# Patient Record
Sex: MALE | Race: WHITE | NOT HISPANIC OR LATINO
[De-identification: names, ages, dates, MRNs, and addresses within clinical notes are randomized per-mention and may not be internally consistent; named-entity substitution may affect disease eponyms.]

---

## 2017-03-30 ENCOUNTER — APPOINTMENT (OUTPATIENT)
Dept: SURGICAL ONCOLOGY | Facility: CLINIC | Age: 82
End: 2017-03-30

## 2017-03-30 VITALS
HEIGHT: 67 IN | WEIGHT: 174 LBS | HEART RATE: 75 BPM | BODY MASS INDEX: 27.31 KG/M2 | SYSTOLIC BLOOD PRESSURE: 148 MMHG | RESPIRATION RATE: 14 BRPM | DIASTOLIC BLOOD PRESSURE: 73 MMHG

## 2017-04-04 ENCOUNTER — RESULT REVIEW (OUTPATIENT)
Age: 82
End: 2017-04-04

## 2017-04-05 ENCOUNTER — OUTPATIENT (OUTPATIENT)
Dept: OUTPATIENT SERVICES | Facility: HOSPITAL | Age: 82
LOS: 1 days | End: 2017-04-05
Payer: COMMERCIAL

## 2017-04-05 DIAGNOSIS — C43.9 MALIGNANT MELANOMA OF SKIN, UNSPECIFIED: ICD-10-CM

## 2017-04-05 PROCEDURE — 88321 CONSLTJ&REPRT SLD PREP ELSWR: CPT

## 2017-04-07 ENCOUNTER — OUTPATIENT (OUTPATIENT)
Dept: OUTPATIENT SERVICES | Facility: HOSPITAL | Age: 82
LOS: 1 days | End: 2017-04-07
Payer: COMMERCIAL

## 2017-04-07 VITALS
SYSTOLIC BLOOD PRESSURE: 128 MMHG | WEIGHT: 179.02 LBS | DIASTOLIC BLOOD PRESSURE: 78 MMHG | OXYGEN SATURATION: 98 % | HEIGHT: 66 IN | HEART RATE: 74 BPM | TEMPERATURE: 99 F | RESPIRATION RATE: 18 BRPM

## 2017-04-07 DIAGNOSIS — Z98.890 OTHER SPECIFIED POSTPROCEDURAL STATES: Chronic | ICD-10-CM

## 2017-04-07 DIAGNOSIS — C43.9 MALIGNANT MELANOMA OF SKIN, UNSPECIFIED: ICD-10-CM

## 2017-04-07 DIAGNOSIS — Z85.820 PERSONAL HISTORY OF MALIGNANT MELANOMA OF SKIN: Chronic | ICD-10-CM

## 2017-04-07 DIAGNOSIS — Z85.46 PERSONAL HISTORY OF MALIGNANT NEOPLASM OF PROSTATE: Chronic | ICD-10-CM

## 2017-04-07 DIAGNOSIS — C43.61 MALIGNANT MELANOMA OF RIGHT UPPER LIMB, INCLUDING SHOULDER: ICD-10-CM

## 2017-04-07 DIAGNOSIS — C44.91 BASAL CELL CARCINOMA OF SKIN, UNSPECIFIED: Chronic | ICD-10-CM

## 2017-04-07 DIAGNOSIS — Z01.818 ENCOUNTER FOR OTHER PREPROCEDURAL EXAMINATION: ICD-10-CM

## 2017-04-07 DIAGNOSIS — I10 ESSENTIAL (PRIMARY) HYPERTENSION: ICD-10-CM

## 2017-04-07 LAB
ANION GAP SERPL CALC-SCNC: 11 MMOL/L — SIGNIFICANT CHANGE UP (ref 5–17)
BUN SERPL-MCNC: 27 MG/DL — HIGH (ref 7–23)
CALCIUM SERPL-MCNC: 9.6 MG/DL — SIGNIFICANT CHANGE UP (ref 8.4–10.5)
CHLORIDE SERPL-SCNC: 103 MMOL/L — SIGNIFICANT CHANGE UP (ref 96–108)
CO2 SERPL-SCNC: 26 MMOL/L — SIGNIFICANT CHANGE UP (ref 22–31)
CREAT SERPL-MCNC: 1.43 MG/DL — HIGH (ref 0.5–1.3)
GLUCOSE SERPL-MCNC: 86 MG/DL — SIGNIFICANT CHANGE UP (ref 70–99)
HCT VFR BLD CALC: 41.4 % — SIGNIFICANT CHANGE UP (ref 39–50)
HGB BLD-MCNC: 13.5 G/DL — SIGNIFICANT CHANGE UP (ref 13–17)
MCHC RBC-ENTMCNC: 31.4 PG — SIGNIFICANT CHANGE UP (ref 27–34)
MCHC RBC-ENTMCNC: 32.6 GM/DL — SIGNIFICANT CHANGE UP (ref 32–36)
MCV RBC AUTO: 96.3 FL — SIGNIFICANT CHANGE UP (ref 80–100)
PLATELET # BLD AUTO: 163 K/UL — SIGNIFICANT CHANGE UP (ref 150–400)
POTASSIUM SERPL-MCNC: 4.6 MMOL/L — SIGNIFICANT CHANGE UP (ref 3.5–5.3)
POTASSIUM SERPL-SCNC: 4.6 MMOL/L — SIGNIFICANT CHANGE UP (ref 3.5–5.3)
RBC # BLD: 4.3 M/UL — SIGNIFICANT CHANGE UP (ref 4.2–5.8)
RBC # FLD: 13.4 % — SIGNIFICANT CHANGE UP (ref 10.3–14.5)
SODIUM SERPL-SCNC: 140 MMOL/L — SIGNIFICANT CHANGE UP (ref 135–145)
WBC # BLD: 15.43 K/UL — HIGH (ref 3.8–10.5)
WBC # FLD AUTO: 15.43 K/UL — HIGH (ref 3.8–10.5)

## 2017-04-07 PROCEDURE — 93010 ELECTROCARDIOGRAM REPORT: CPT

## 2017-04-07 PROCEDURE — 93005 ELECTROCARDIOGRAM TRACING: CPT

## 2017-04-07 PROCEDURE — 80048 BASIC METABOLIC PNL TOTAL CA: CPT

## 2017-04-07 PROCEDURE — 85027 COMPLETE CBC AUTOMATED: CPT

## 2017-04-07 RX ORDER — LIDOCAINE HCL 20 MG/ML
0.2 VIAL (ML) INJECTION ONCE
Qty: 0 | Refills: 0 | Status: DISCONTINUED | OUTPATIENT
Start: 2017-04-18 | End: 2017-05-03

## 2017-04-07 RX ORDER — SODIUM CHLORIDE 9 MG/ML
3 INJECTION INTRAMUSCULAR; INTRAVENOUS; SUBCUTANEOUS EVERY 8 HOURS
Qty: 0 | Refills: 0 | Status: DISCONTINUED | OUTPATIENT
Start: 2017-04-18 | End: 2017-05-03

## 2017-04-07 NOTE — H&P PST ADULT - NSANTHOSAYNRD_GEN_A_CORE
No. MEAGAN screening performed.  STOP BANG Legend: 0-2 = LOW Risk; 3-4 = INTERMEDIATE Risk; 5-8 = HIGH Risk

## 2017-04-07 NOTE — H&P PST ADULT - PSH
Basal cell carcinoma  excision from back x2 and right cheek with MOHS 2011  H/O circumcision  early age  H/O Malignant melanoma  excision of melanoma on back x 2 2008/ 2016  H/O prostate cancer  s/p seed inplantation 2008

## 2017-04-07 NOTE — H&P PST ADULT - PMH
Basal cell carcinoma  excised from back x2 and Excision of basal cell right cheek with MOHS 2011  Hyperlipidemia    Hypertension    Macular degeneration  beginning stage  Melanoma  right shoulder  Prostate cancer  s/p  seed inplant 2008

## 2017-04-07 NOTE — H&P PST ADULT - HISTORY OF PRESENT ILLNESS
This is a 84 year old male with right shoulder.  Pt has had renita x 3 years and told dermatologist of right shoulder.  Biopsy done and pt went to Dr Browne.  Now scheduled for wide excision melanoma right shoulder with plastic closure on 4-18-17

## 2017-04-12 ENCOUNTER — APPOINTMENT (OUTPATIENT)
Dept: PLASTIC SURGERY | Facility: CLINIC | Age: 82
End: 2017-04-12

## 2017-04-12 VITALS
SYSTOLIC BLOOD PRESSURE: 127 MMHG | TEMPERATURE: 98.2 F | BODY MASS INDEX: 28.12 KG/M2 | HEART RATE: 99 BPM | HEIGHT: 66 IN | DIASTOLIC BLOOD PRESSURE: 76 MMHG | WEIGHT: 175 LBS

## 2017-04-12 DIAGNOSIS — E78.00 PURE HYPERCHOLESTEROLEMIA, UNSPECIFIED: ICD-10-CM

## 2017-04-12 DIAGNOSIS — I10 ESSENTIAL (PRIMARY) HYPERTENSION: ICD-10-CM

## 2017-04-13 PROBLEM — I10 HYPERTENSION: Status: ACTIVE | Noted: 2017-04-12

## 2017-04-13 PROBLEM — E78.00 HIGH CHOLESTEROL: Status: ACTIVE | Noted: 2017-04-12

## 2017-04-17 ENCOUNTER — RESULT REVIEW (OUTPATIENT)
Age: 82
End: 2017-04-17

## 2017-04-18 ENCOUNTER — APPOINTMENT (OUTPATIENT)
Dept: SURGICAL ONCOLOGY | Facility: HOSPITAL | Age: 82
End: 2017-04-18

## 2017-04-18 ENCOUNTER — OUTPATIENT (OUTPATIENT)
Dept: OUTPATIENT SERVICES | Facility: HOSPITAL | Age: 82
LOS: 1 days | End: 2017-04-18
Payer: MEDICARE

## 2017-04-18 VITALS
DIASTOLIC BLOOD PRESSURE: 78 MMHG | HEART RATE: 71 BPM | OXYGEN SATURATION: 100 % | SYSTOLIC BLOOD PRESSURE: 132 MMHG | RESPIRATION RATE: 16 BRPM

## 2017-04-18 VITALS
TEMPERATURE: 99 F | SYSTOLIC BLOOD PRESSURE: 147 MMHG | HEIGHT: 66 IN | HEART RATE: 74 BPM | WEIGHT: 179.02 LBS | DIASTOLIC BLOOD PRESSURE: 78 MMHG | OXYGEN SATURATION: 98 % | RESPIRATION RATE: 18 BRPM

## 2017-04-18 DIAGNOSIS — Z85.820 PERSONAL HISTORY OF MALIGNANT MELANOMA OF SKIN: Chronic | ICD-10-CM

## 2017-04-18 DIAGNOSIS — Z98.890 OTHER SPECIFIED POSTPROCEDURAL STATES: Chronic | ICD-10-CM

## 2017-04-18 DIAGNOSIS — C43.61 MALIGNANT MELANOMA OF RIGHT UPPER LIMB, INCLUDING SHOULDER: ICD-10-CM

## 2017-04-18 DIAGNOSIS — Z85.46 PERSONAL HISTORY OF MALIGNANT NEOPLASM OF PROSTATE: Chronic | ICD-10-CM

## 2017-04-18 DIAGNOSIS — C44.91 BASAL CELL CARCINOMA OF SKIN, UNSPECIFIED: Chronic | ICD-10-CM

## 2017-04-18 PROCEDURE — 88305 TISSUE EXAM BY PATHOLOGIST: CPT | Mod: 26

## 2017-04-18 PROCEDURE — 14021 TIS TRNFR S/A/L 10.1-30 SQCM: CPT

## 2017-04-18 PROCEDURE — 88305 TISSUE EXAM BY PATHOLOGIST: CPT

## 2017-04-18 PROCEDURE — 11606 EXC TR-EXT MAL+MARG >4 CM: CPT

## 2017-04-18 RX ORDER — FAMOTIDINE 10 MG/ML
2 INJECTION INTRAVENOUS
Qty: 0 | Refills: 0 | COMMUNITY

## 2017-04-18 RX ORDER — VALSARTAN 80 MG/1
1 TABLET ORAL
Qty: 0 | Refills: 0 | COMMUNITY

## 2017-04-18 RX ORDER — SODIUM CHLORIDE 9 MG/ML
1000 INJECTION, SOLUTION INTRAVENOUS
Qty: 0 | Refills: 0 | Status: DISCONTINUED | OUTPATIENT
Start: 2017-04-18 | End: 2017-05-03

## 2017-04-18 RX ORDER — SIMVASTATIN 20 MG/1
1 TABLET, FILM COATED ORAL
Qty: 0 | Refills: 0 | COMMUNITY

## 2017-04-18 RX ORDER — ASCORBIC ACID 60 MG
1 TABLET,CHEWABLE ORAL
Qty: 0 | Refills: 0 | COMMUNITY

## 2017-04-18 RX ORDER — CELECOXIB 200 MG/1
200 CAPSULE ORAL ONCE
Qty: 0 | Refills: 0 | Status: DISCONTINUED | OUTPATIENT
Start: 2017-04-18 | End: 2017-05-03

## 2017-04-18 RX ORDER — OXYCODONE HYDROCHLORIDE 5 MG/1
5 TABLET ORAL ONCE
Qty: 0 | Refills: 0 | Status: DISCONTINUED | OUTPATIENT
Start: 2017-04-18 | End: 2017-04-18

## 2017-04-18 RX ORDER — OXYCODONE HYDROCHLORIDE 5 MG/1
1 TABLET ORAL
Qty: 12 | Refills: 0 | OUTPATIENT
Start: 2017-04-18 | End: 2017-04-21

## 2017-04-18 RX ORDER — ONDANSETRON 8 MG/1
4 TABLET, FILM COATED ORAL ONCE
Qty: 0 | Refills: 0 | Status: DISCONTINUED | OUTPATIENT
Start: 2017-04-18 | End: 2017-05-03

## 2017-04-18 NOTE — BRIEF OPERATIVE NOTE - OPERATION/FINDINGS
local tissue reconstruction of 5.5  x4.4 cm defect after wide excision of melanoma.
minimum 1 cm margin

## 2017-04-18 NOTE — ASU DISCHARGE PLAN (ADULT/PEDIATRIC). - MEDICATION SUMMARY - MEDICATIONS TO TAKE
I will START or STAY ON the medications listed below when I get home from the hospital:    oxyCODONE 5 mg oral tablet  -- 1 tab(s) by mouth every 6 hours, As Needed MDD:6  -- Caution federal law prohibits the transfer of this drug to any person other  than the person for whom it was prescribed.  It is very important that you take or use this exactly as directed.  Do not skip doses or discontinue unless directed by your doctor.  May cause drowsiness.  Alcohol may intensify this effect.  Use care when operating dangerous machinery.  This prescription cannot be refilled.  Using more of this medication than prescribed may cause serious breathing problems.    -- Indication: For pain medication    valsartan 160 mg oral tablet  -- 1 tab(s) by mouth once a day (at bedtime)  -- Indication: For home medication    simvastatin 20 mg oral tablet  -- 1 tab(s) by mouth once a day (at bedtime)  -- Indication: For home medication    multivitamin  -- 1  by mouth once a day last dose 4-10-17  -- Indication: For home medication    Vitamin C 500 mg oral capsule  -- 1 cap(s) by mouth once a day  -- Indication: For home medication

## 2017-04-18 NOTE — ASU DISCHARGE PLAN (ADULT/PEDIATRIC). - NOTIFY
Persistent Nausea and Vomiting/Unable to Urinate/Fever greater than 101/Pain not relieved by Medications Bleeding that does not stop/Pain not relieved by Medications/Fever greater than 101/Persistent Nausea and Vomiting/Unable to Urinate/Swelling that continues

## 2017-04-18 NOTE — ASU PREOP CHECKLIST - TO WHOM
[facility] /report received from RUTHY Parks

## 2017-04-18 NOTE — ASU PATIENT PROFILE, ADULT - VISION (WITH CORRECTIVE LENSES IF THE PATIENT USUALLY WEARS THEM):
distance & reading/Normal vision: sees adequately in most situations; can see medication labels, newsprint

## 2017-04-18 NOTE — BRIEF OPERATIVE NOTE - PRE-OP DX
Melanoma of shoulder, right  04/18/2017    Active  Johnson Bronwe Melanoma of shoulder, right  04/18/2017    Active  Johnson Browne

## 2017-04-20 ENCOUNTER — TRANSCRIPTION ENCOUNTER (OUTPATIENT)
Age: 82
End: 2017-04-20

## 2017-04-21 LAB — SURGICAL PATHOLOGY STUDY: SIGNIFICANT CHANGE UP

## 2017-04-22 ENCOUNTER — TRANSCRIPTION ENCOUNTER (OUTPATIENT)
Age: 82
End: 2017-04-22

## 2017-04-26 ENCOUNTER — APPOINTMENT (OUTPATIENT)
Dept: PLASTIC SURGERY | Facility: CLINIC | Age: 82
End: 2017-04-26

## 2017-05-01 ENCOUNTER — APPOINTMENT (OUTPATIENT)
Dept: PLASTIC SURGERY | Facility: CLINIC | Age: 82
End: 2017-05-01

## 2017-05-10 ENCOUNTER — APPOINTMENT (OUTPATIENT)
Dept: PLASTIC SURGERY | Facility: CLINIC | Age: 82
End: 2017-05-10

## 2017-05-10 DIAGNOSIS — C43.59 MALIGNANT MELANOMA OF OTHER PART OF TRUNK: ICD-10-CM

## 2018-07-19 NOTE — ASU DISCHARGE PLAN (ADULT/PEDIATRIC). - MEDICATION SUMMARY - MEDICATIONS TO CHANGE
Disoriented I will SWITCH the dose or number of times a day I take the medications listed below when I get home from the hospital:  None

## 2019-07-30 NOTE — H&P PST ADULT - DOES THIS PATIENT HAVE A HISTORY OF OR HAS BEEN DX WITH HEART FAILURE?
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 Taking Ca/Vit D, discussed.  ROS reviewed/discussed. Last Bone Densitometry (7/19): +osteopenia\par Mammo/Sono (7/30/19): HD, NSF\par 
no

## 2020-10-21 NOTE — H&P PST ADULT - ADMIT DATE
07-Apr-2017 [Thin] : thin [Normal] : affect appropriate [de-identified] : no teeth  [de-identified] : no palpable adenopathy

## 2022-06-12 NOTE — H&P PST ADULT - ATTENDING COMMENTS
Jacky Colin(Resident) 84 year old man with a large diameter 0.45 mm melanoma of the right shoulder, scheduled for w.e. and reconstruction (Dr Johnson).    D/W Pt and wife in the office, and again today    All questions answered.  Consent on chart

## 2024-11-07 NOTE — ASU PREOP CHECKLIST - SPO2 (%)
"Orthopaedic Surgery Progress Note    Subjective:  No acute events overnight. Doing well. Pain well controlled. Denies chest pain, shortness of breath, or fevers.    Objective:  BP (!) 149/91   Pulse 82   Temp 36.6 °C (97.9 °F)   Resp 16   Ht 1.778 m (5' 10\")   Wt 97.5 kg (215 lb)   SpO2 99%   BMI 30.85 kg/m²     Gen: arousable, NAD, appropriately conversational  Cardiac: RRR to peripheral palpation  Resp: nonlabored on RA  GI: soft, nondistended    MSK:  RUE:   - STS intact  -Motor intact in axillary/AIN/PIN/ulnar distributions  -SILT axillary/radial/median/ulnar distributions  -Hand wwp, cap refill brisk  -Compartments soft and compressible, no pain with passive stretch of digits    No results found for this or any previous visit (from the past 24 hours).      FL fluoro images no charge   Final Result      XR forearm right 2 views   Final Result        Interval reduction right both-bone forearm fractures. The angulation   is improved but there is still displacement and overriding of both   the radius and ulna.        Nondisplaced distal ulnar diaphyseal fracture not as well seen as on   the earlier radiographs.        Signed by: Vinicius Farmer 11/5/2024 5:02 PM   Dictation workstation:   [id number]      XR elbow right 1-2 views   Final Result        Interval reduction right both-bone forearm fractures. The angulation   is improved but there is still displacement and overriding of both   the radius and ulna.        Nondisplaced distal ulnar diaphyseal fracture not as well seen as on   the earlier radiographs.        Signed by: Vinicius Farmer 11/5/2024 5:02 PM   Dictation workstation:   [id number]      CT cervical spine wo IV contrast   Final Result   CT HEAD:   1. No acute intracranial hemorrhage or depressed calvarial fracture.   2. Mild white matter hypodensity compatible with microangiopathy.             CT CERVICAL SPINE:   1. No acute fracture or traumatic malalignment of the cervical spine.   2. " Heterogenous enlargement of the thyroid gland. Further evaluation   with non-emergent thyroid ultrasound on an outpatient basis may be   obtained as per clinical need.   3. Fat density mass within the posterior neck on the right likely   represents a lipoma measuring 6.7 x 4.0 cm.             I personally reviewed the images/study and I agree with the findings   as stated by Dr. Rubens Quach. This study was interpreted at [place], [place].        MACRO:   None.        Signed by: Carine Noyola 11/5/2024 2:00 PM   Dictation workstation:   [id number]      CT head wo IV contrast   Final Result   CT HEAD:   1. No acute intracranial hemorrhage or depressed calvarial fracture.   2. Mild white matter hypodensity compatible with microangiopathy.             CT CERVICAL SPINE:   1. No acute fracture or traumatic malalignment of the cervical spine.   2. Heterogenous enlargement of the thyroid gland. Further evaluation   with non-emergent thyroid ultrasound on an outpatient basis may be   obtained as per clinical need.   3. Fat density mass within the posterior neck on the right likely   represents a lipoma measuring 6.7 x 4.0 cm.             I personally reviewed the images/study and I agree with the findings   as stated by Dr. Rubens Quach. This study was interpreted at [place], [place].        MACRO:   None.        Signed by: Carine Noyola 11/5/2024 2:00 PM   Dictation workstation:   [id number]      XR forearm right 2 views   Final Result   Midshaft both-bone forearm fracture with displacement and overriding.        Additional nondisplaced distal ulnar diaphyseal fracture.        Signed by: Vinicius Farmer 11/5/2024 1:39 PM   Dictation workstation:   [id number]      XR elbow right 1-2 views   Final Result   Midshaft both-bone forearm fracture with displacement and overriding.        Additional nondisplaced distal ulnar diaphyseal fracture.         Signed by: Vinicius Farmer 11/5/2024 1:39 PM   Dictation workstation:   [id number]      XR wrist right 1-2 views   Final Result   Midshaft both-bone forearm fracture with displacement and overriding.        Additional nondisplaced distal ulnar diaphyseal fracture.        Signed by: Vinicius Farmer 11/5/2024 1:39 PM   Dictation workstation:   [id number]      XR chest 1 view   Final Result        No evidence of acute intrathoracic abnormality.        Signed by: Vinicius Farmer 11/5/2024 1:40 PM   Dictation workstation:   [id number]          Assessment/Plan: 67 y.o. male s/p ORIF R forearm on 11/6/24 with Dr. Garcia.     - Weightbearing: NWB RUE  - DVT PPx: SCDs, DVT chemoppx per primary, however recommend at least 4 weeks of DVT chemoprophylaxis  - Pain: Multimodal pain regimen per primary  - Antibiotics: postop Ancef 2g q8hr x 3 doses. Complete.  - Dressing: maintain splint until follow up    We will follow peripherally while patient is in house. Pt should be NWB RUE until first follow up appointment. Patient will require 6 weeks total of DVT prophylaxis from an orthopedic standpoint, if ok per primary team. Patient currently has STS on surgical site. Dressing should remain in place until follow up. Please send home with Calcium/Vitamin D 500mg-400IU BID for 6 weeks. Patient should follow up w/ Dr. Garcia 2-3 weeks after surgery for post-operative appointment (patient may call [phone] to schedule). Please page with questions.      Plan discussed with Dr. Garcia. Recommendations not finalized until note signed by attending.    Brianna Heard MD  Orthopedic Surgery PGY-1  Available by Epic Chat    While admitted, this patient will be followed by the Ortho Trauma Team, available via Epic Chat weekdays 6a-6p. Please page [phone] on nights and weekends.    Ortho Trauma  First Call: Brianna Heard, PGY-1  Second Call: Flavio Anand PGY-2  Third Call: Margo Manrique, PGY-3       [phone]